# Patient Record
Sex: MALE | Race: WHITE | NOT HISPANIC OR LATINO | Employment: OTHER | URBAN - METROPOLITAN AREA
[De-identification: names, ages, dates, MRNs, and addresses within clinical notes are randomized per-mention and may not be internally consistent; named-entity substitution may affect disease eponyms.]

---

## 2020-01-31 ENCOUNTER — HISTORICAL (OUTPATIENT)
Dept: ADMINISTRATIVE | Facility: HOSPITAL | Age: 80
End: 2020-01-31

## 2022-04-10 ENCOUNTER — HISTORICAL (OUTPATIENT)
Dept: ADMINISTRATIVE | Facility: HOSPITAL | Age: 82
End: 2022-04-10

## 2022-04-26 VITALS
OXYGEN SATURATION: 98 % | WEIGHT: 184.94 LBS | DIASTOLIC BLOOD PRESSURE: 60 MMHG | SYSTOLIC BLOOD PRESSURE: 128 MMHG | HEIGHT: 67 IN | BODY MASS INDEX: 29.03 KG/M2

## 2023-06-14 ENCOUNTER — OFFICE VISIT (OUTPATIENT)
Dept: FAMILY MEDICINE | Facility: CLINIC | Age: 83
End: 2023-06-14
Payer: MEDICARE

## 2023-06-14 VITALS
HEART RATE: 92 BPM | TEMPERATURE: 98 F | SYSTOLIC BLOOD PRESSURE: 114 MMHG | OXYGEN SATURATION: 98 % | BODY MASS INDEX: 26.78 KG/M2 | DIASTOLIC BLOOD PRESSURE: 56 MMHG | WEIGHT: 170.63 LBS | HEIGHT: 67 IN

## 2023-06-14 DIAGNOSIS — I10 PRIMARY HYPERTENSION: ICD-10-CM

## 2023-06-14 DIAGNOSIS — N18.2 STAGE 2 CHRONIC KIDNEY DISEASE: ICD-10-CM

## 2023-06-14 DIAGNOSIS — E87.1 HYPONATREMIA: ICD-10-CM

## 2023-06-14 DIAGNOSIS — E11.22 TYPE 2 DIABETES MELLITUS WITH STAGE 2 CHRONIC KIDNEY DISEASE, WITHOUT LONG-TERM CURRENT USE OF INSULIN: ICD-10-CM

## 2023-06-14 DIAGNOSIS — Z01.818 PRE-OPERATIVE CLEARANCE: ICD-10-CM

## 2023-06-14 DIAGNOSIS — M19.90 OSTEOARTHRITIS, UNSPECIFIED OSTEOARTHRITIS TYPE, UNSPECIFIED SITE: ICD-10-CM

## 2023-06-14 DIAGNOSIS — R82.71 BACTERIURIA: ICD-10-CM

## 2023-06-14 DIAGNOSIS — J44.89 ASTHMA-CHRONIC OBSTRUCTIVE PULMONARY DISEASE OVERLAP SYNDROME: Primary | ICD-10-CM

## 2023-06-14 DIAGNOSIS — N18.2 TYPE 2 DIABETES MELLITUS WITH STAGE 2 CHRONIC KIDNEY DISEASE, WITHOUT LONG-TERM CURRENT USE OF INSULIN: ICD-10-CM

## 2023-06-14 PROBLEM — E11.9 DIABETES MELLITUS: Status: ACTIVE | Noted: 2023-06-14

## 2023-06-14 LAB
ANION GAP SERPL CALC-SCNC: 9 MEQ/L (ref 2–13)
BILIRUB SERPL-MCNC: NEGATIVE MG/DL
BLOOD URINE, POC: NORMAL
BUN SERPL-MCNC: 34 MG/DL (ref 7–20)
CALCIUM SERPL-MCNC: 9.5 MG/DL (ref 8.4–10.2)
CHLORIDE SERPL-SCNC: 99 MMOL/L (ref 98–110)
CO2 SERPL-SCNC: 27 MMOL/L (ref 21–32)
COLOR, POC UA: YELLOW
CREAT SERPL-MCNC: 1.16 MG/DL (ref 0.66–1.25)
CREAT/UREA NIT SERPL: 29 (ref 12–20)
GFR SERPLBLD CREATININE-BSD FMLA CKD-EPI: 62 MLS/MIN/1.73/M2
GLUCOSE SERPL-MCNC: 148 MG/DL (ref 70–115)
GLUCOSE UR QL STRIP: >=1000
KETONES UR QL STRIP: NEGATIVE
LEUKOCYTE ESTERASE URINE, POC: NORMAL
NITRITE, POC UA: NEGATIVE
PH, POC UA: 5
POTASSIUM SERPL-SCNC: 4.4 MMOL/L (ref 3.5–5.1)
PROTEIN, POC: NORMAL
SODIUM SERPL-SCNC: 135 MMOL/L (ref 135–145)
SPECIFIC GRAVITY, POC UA: 1.01
UROBILINOGEN, POC UA: 0.2

## 2023-06-14 PROCEDURE — 99214 OFFICE O/P EST MOD 30 MIN: CPT | Mod: ,,, | Performed by: FAMILY MEDICINE

## 2023-06-14 PROCEDURE — 81003 URINALYSIS AUTO W/O SCOPE: CPT | Mod: RHCUB | Performed by: FAMILY MEDICINE

## 2023-06-14 PROCEDURE — 99214 PR OFFICE/OUTPT VISIT, EST, LEVL IV, 30-39 MIN: ICD-10-PCS | Mod: ,,, | Performed by: FAMILY MEDICINE

## 2023-06-14 RX ORDER — CIPROFLOXACIN HYDROCHLORIDE 250 MG/1
250 TABLET, FILM COATED ORAL EVERY 12 HOURS
COMMUNITY
Start: 2023-06-05 | End: 2023-06-16

## 2023-06-14 RX ORDER — EMPAGLIFLOZIN 10 MG/1
10 TABLET, FILM COATED ORAL DAILY
COMMUNITY
Start: 2023-05-17 | End: 2023-09-13

## 2023-06-14 RX ORDER — GLIMEPIRIDE 4 MG/1
4 TABLET ORAL DAILY
COMMUNITY
Start: 2023-04-19 | End: 2023-11-20

## 2023-06-14 RX ORDER — CIPROFLOXACIN 500 MG/1
TABLET ORAL
COMMUNITY
Start: 2023-06-05 | End: 2023-06-16

## 2023-06-14 RX ORDER — ASPIRIN 81 MG/1
81 TABLET ORAL DAILY
COMMUNITY

## 2023-06-14 RX ORDER — SITAGLIPTIN AND METFORMIN HYDROCHLORIDE 1000; 50 MG/1; MG/1
1 TABLET, FILM COATED ORAL 2 TIMES DAILY
COMMUNITY
Start: 2023-06-07 | End: 2023-09-11

## 2023-06-14 RX ORDER — LOSARTAN POTASSIUM 100 MG/1
100 TABLET ORAL DAILY
COMMUNITY
Start: 2023-05-17 | End: 2023-09-05

## 2023-06-14 RX ORDER — HYDROCHLOROTHIAZIDE 25 MG/1
TABLET ORAL
COMMUNITY
Start: 2022-08-31

## 2023-06-14 RX ORDER — FLUTICASONE PROPIONATE 50 MCG
2 SPRAY, SUSPENSION (ML) NASAL DAILY PRN
COMMUNITY
End: 2023-06-16

## 2023-06-14 RX ORDER — DUTASTERIDE 0.5 MG/1
0.5 CAPSULE, LIQUID FILLED ORAL DAILY
COMMUNITY
Start: 2023-03-21 | End: 2023-09-18

## 2023-06-14 RX ORDER — TAMSULOSIN HYDROCHLORIDE 0.4 MG/1
1 CAPSULE ORAL 2 TIMES DAILY
COMMUNITY
Start: 2023-05-12 | End: 2023-11-20

## 2023-06-14 RX ORDER — PIOGLITAZONEHYDROCHLORIDE 15 MG/1
15 TABLET ORAL DAILY
COMMUNITY
Start: 2023-05-17 | End: 2023-09-05

## 2023-06-14 NOTE — PROGRESS NOTES
"SUBJECTIVE:  Jose Alfredo Shearer is a 83 y.o. male here for No chief complaint on file.      HPI  Patient is here for preop exam.  He is scheduled next week for a total knee arthroplasty.  Patient has multiple medical problems including some advanced age at 83.  He has a history of hypertension which has been stable on losartan and hydrochlorothiazide.  He has a history of diabetes which has been very well controlled on glimepiride, Jardiance and pioglitazone.  His preop labs included a urinalysis that had a culture that grew Enterococcus.  He was started on Cipro by the orthopedic surgeon.  He said prior to this he was getting up several times at night to urinate and this has improved since starting the antibiotics.  He is also on Flomax for BPH.  He has a history of chronic kidney disease stage 2 and he did have some mild hyponatremia with a sodium of 135 on his initial preop labs.  Jose Alfredo's allergies, medications, history, and problem list were updated as appropriate.    Review of Systems   No chest pain or shortness of breath.  He does get little tired with walking.  He also has a lot of pain in his knees when he walks and he has to use a cane    Recent Results (from the past 504 hour(s))   POCT urinalysis, dipstick or tablet reag    Collection Time: 06/14/23 11:16 AM   Result Value Ref Range    Color, UA Yellow     Spec Grav UA 1.015     pH, UA 5.0     WBC, UA small     Nitrite, UA negative     Protein, POC trace     Glucose, UA >=1,000     Ketones, UA negative     Urobilinogen, UA 0.2     Bilirubin, POC negative     Blood, UA moderate        OBJECTIVE:  Vital signs  Vitals:    06/14/23 1043   BP: (!) 114/56   BP Location: Right arm   Patient Position: Sitting   Pulse: 92   Temp: 97.7 °F (36.5 °C)   TempSrc: Oral   SpO2: 98%   Weight: 36926 g (170 lb 9.6 oz)   Height: 169.5 cm (66.73")        Physical Exam heart regular rate and rhythm, lungs are clear to auscultation bilateral    ASSESSMENT/PLAN:  1. " Asthma-chronic obstructive pulmonary disease overlap syndrome  This has been doing well lately and not having to use his inhaler in quite some time    2. Primary hypertension  Blood pressure is very well controlled.  I will talk to him in a few days about what medications to hold prior to his surgery    3. Stage 2 chronic kidney disease  Repeat his BMP to check his sodium today  -     Basic Metabolic Panel; Future; Expected date: 06/14/2023    4. Type 2 diabetes mellitus with stage 2 chronic kidney disease, without long-term current use of insulin  A1c has been very well controlled with his most recent A1c of 6.1.  We will need to discontinue his Jardiance 2 days before surgery.  Patient did not have his medications with him today so I asked him to come back in 2 days so we can go over all of the medications we will need to hold prior to surgery    5. Osteoarthritis, unspecified osteoarthritis type, unspecified site  Stable    6. Bacteriuria  Repeat his urinalysis and culture today  -     POCT urinalysis, dipstick or tablet reag  -     Urine culture    7. Hyponatremia  Repeat his BMP today    8. Pre-operative clearance  I think from a cardiovascular standpoint he can tolerate the surgery.  He did have an EKG that was normal and I do not think he needs to have an echocardiogram as he is never had any heart failure type symptoms.         Follow Up:  Follow up in about 2 days (around 6/16/2023).  I would like him to follow up in 2 days just to go over each of his medications individually prior to his surgery early next week .I would also like to talk to the orthopedic surgeon about whether he might need rehab after

## 2023-06-16 ENCOUNTER — OFFICE VISIT (OUTPATIENT)
Dept: FAMILY MEDICINE | Facility: CLINIC | Age: 83
End: 2023-06-16
Payer: MEDICARE

## 2023-06-16 VITALS
TEMPERATURE: 98 F | BODY MASS INDEX: 26.68 KG/M2 | HEART RATE: 105 BPM | HEIGHT: 67 IN | WEIGHT: 170 LBS | SYSTOLIC BLOOD PRESSURE: 120 MMHG | DIASTOLIC BLOOD PRESSURE: 64 MMHG | OXYGEN SATURATION: 97 %

## 2023-06-16 DIAGNOSIS — N18.2 TYPE 2 DIABETES MELLITUS WITH STAGE 2 CHRONIC KIDNEY DISEASE, WITHOUT LONG-TERM CURRENT USE OF INSULIN: Primary | ICD-10-CM

## 2023-06-16 DIAGNOSIS — N18.2 STAGE 2 CHRONIC KIDNEY DISEASE: ICD-10-CM

## 2023-06-16 DIAGNOSIS — N39.0 UTI (URINARY TRACT INFECTION) DUE TO ENTEROCOCCUS: ICD-10-CM

## 2023-06-16 DIAGNOSIS — B95.2 UTI (URINARY TRACT INFECTION) DUE TO ENTEROCOCCUS: ICD-10-CM

## 2023-06-16 DIAGNOSIS — I10 PRIMARY HYPERTENSION: ICD-10-CM

## 2023-06-16 DIAGNOSIS — E11.22 TYPE 2 DIABETES MELLITUS WITH STAGE 2 CHRONIC KIDNEY DISEASE, WITHOUT LONG-TERM CURRENT USE OF INSULIN: Primary | ICD-10-CM

## 2023-06-16 PROCEDURE — 99214 OFFICE O/P EST MOD 30 MIN: CPT | Mod: ,,, | Performed by: FAMILY MEDICINE

## 2023-06-16 PROCEDURE — 99214 PR OFFICE/OUTPT VISIT, EST, LEVL IV, 30-39 MIN: ICD-10-PCS | Mod: ,,, | Performed by: FAMILY MEDICINE

## 2023-06-16 RX ORDER — OMEPRAZOLE 20 MG/1
20 CAPSULE, DELAYED RELEASE ORAL DAILY
COMMUNITY

## 2023-06-16 RX ORDER — CEFDINIR 300 MG/1
300 CAPSULE ORAL EVERY 12 HOURS
Qty: 10 CAPSULE | Refills: 0 | Status: SHIPPED | OUTPATIENT
Start: 2023-06-16 | End: 2023-06-21

## 2023-06-16 NOTE — PROGRESS NOTES
SUBJECTIVE:  Jose Alfredo Shearer is a 83 y.o. male here for Follow-up      HPI  Patient is here for follow-up in preparation for his total knee arthroplasty.  And still not been able to get in touch with the orthopedic surgeon to discuss whether he may need to go to rehab following surgery.  I did want to come back in with all his medications to go over each individual medication about which 1 to take prior to surgery.  Also noted that his urinalysis we repeated 2 days ago is growing Gram-negative rods again.  He had a UA done preoperatively that grew Enterobacter.  He was treated with 3 days of antibiotics you did.  He was having frequency of urination at night which has improved since taking the 3 days of antibiotics.  See assessment plan for individual list of medications to be held  Jose Alfredo's allergies, medications, history, and problem list were updated as appropriate.    Review of Systems   A comprehensive review of symptoms was completed and negative except as noted above.    Recent Results (from the past 504 hour(s))   Urine culture    Collection Time: 06/14/23 11:08 AM    Specimen: Urine, Clean Catch   Result Value Ref Range    Urine Culture >/= 100,000 colonies/ml Gram-negative Rods (A)    Basic Metabolic Panel    Collection Time: 06/14/23 11:14 AM   Result Value Ref Range    Sodium Level 135 135 - 145 mmol/L    Potassium Level 4.4 3.5 - 5.1 mmol/L    Chloride 99 98 - 110 mmol/L    Carbon Dioxide 27 21 - 32 mmol/L    Glucose Level 148 (H) 70 - 115 mg/dL    Blood Urea Nitrogen 34.0 (H) 7.0 - 20.0 mg/dL    Creatinine 1.16 0.66 - 1.25 mg/dL    BUN/Creatinine Ratio 29 (H) 12 - 20    Calcium Level Total 9.5 8.4 - 10.2 mg/dL    Anion Gap 9.0 2.0 - 13.0 mEq/L    eGFR 62 mls/min/1.73/m2   POCT urinalysis, dipstick or tablet reag    Collection Time: 06/14/23 11:16 AM   Result Value Ref Range    Color, UA Yellow     Spec Grav UA 1.015     pH, UA 5.0     WBC, UA small     Nitrite, UA negative     Protein, POC trace      "Glucose, UA >=1,000     Ketones, UA negative     Urobilinogen, UA 0.2     Bilirubin, POC negative     Blood, UA moderate        OBJECTIVE:  Vital signs  Vitals:    06/16/23 1053   BP: 120/64   BP Location: Right arm   Patient Position: Sitting   BP Method: Medium (Manual)   Pulse: 105   Temp: 98.1 °F (36.7 °C)   TempSrc: Temporal   SpO2: 97%   Weight: 77.1 kg (170 lb)   Height: 5' 6.73" (1.695 m)        Physical Exam heart with regular rate rhythm, lungs are clear, he is in no distress  ASSESSMENT/PLAN:  1. UTI  Will treat with cefdinir twice a day for 5 days.  2. Diabetes  I would like him to hold his Jardiance starting today and can restart the Jardiance when he gets home from surgery.  Stop the glimepiride the day before surgery on the day of surgery. He can continue his pioglitazone and Janumet daily  3. Hypertension  Discontinue his hydrochlorothiazide the day before surgery and the morning of surgery id continue his losartan each day      Follow Up:  No follow-ups on file.            "

## 2023-06-17 LAB — BACTERIA UR CULT: ABNORMAL

## 2023-06-20 ENCOUNTER — TELEPHONE (OUTPATIENT)
Dept: FAMILY MEDICINE | Facility: CLINIC | Age: 83
End: 2023-06-20
Payer: MEDICARE

## 2023-06-20 DIAGNOSIS — N39.0 UTI (URINARY TRACT INFECTION) DUE TO ENTEROCOCCUS: Primary | ICD-10-CM

## 2023-06-20 DIAGNOSIS — B95.2 UTI (URINARY TRACT INFECTION) DUE TO ENTEROCOCCUS: Primary | ICD-10-CM

## 2023-06-20 RX ORDER — CIPROFLOXACIN 500 MG/1
500 TABLET ORAL 2 TIMES DAILY
Qty: 28 TABLET | Refills: 0 | Status: SHIPPED | OUTPATIENT
Start: 2023-06-20 | End: 2023-07-04

## 2023-07-03 ENCOUNTER — TELEPHONE (OUTPATIENT)
Dept: FAMILY MEDICINE | Facility: CLINIC | Age: 83
End: 2023-07-03
Payer: MEDICARE

## 2023-07-05 ENCOUNTER — OFFICE VISIT (OUTPATIENT)
Dept: FAMILY MEDICINE | Facility: CLINIC | Age: 83
End: 2023-07-05
Payer: MEDICARE

## 2023-07-05 VITALS
WEIGHT: 173.63 LBS | HEART RATE: 100 BPM | BODY MASS INDEX: 27.25 KG/M2 | SYSTOLIC BLOOD PRESSURE: 128 MMHG | HEIGHT: 67 IN | OXYGEN SATURATION: 99 % | TEMPERATURE: 97 F | DIASTOLIC BLOOD PRESSURE: 68 MMHG

## 2023-07-05 DIAGNOSIS — Z01.818 PRE-OPERATIVE CLEARANCE: Primary | ICD-10-CM

## 2023-07-05 DIAGNOSIS — N30.01 ACUTE CYSTITIS WITH HEMATURIA: ICD-10-CM

## 2023-07-05 DIAGNOSIS — N18.2 TYPE 2 DIABETES MELLITUS WITH STAGE 2 CHRONIC KIDNEY DISEASE, WITHOUT LONG-TERM CURRENT USE OF INSULIN: ICD-10-CM

## 2023-07-05 DIAGNOSIS — N39.0 UTI (URINARY TRACT INFECTION): ICD-10-CM

## 2023-07-05 DIAGNOSIS — N18.2 STAGE 2 CHRONIC KIDNEY DISEASE: ICD-10-CM

## 2023-07-05 DIAGNOSIS — E11.22 TYPE 2 DIABETES MELLITUS WITH STAGE 2 CHRONIC KIDNEY DISEASE, WITHOUT LONG-TERM CURRENT USE OF INSULIN: ICD-10-CM

## 2023-07-05 LAB
BILIRUB UR QL STRIP: NEGATIVE
GLUCOSE UR QL STRIP: POSITIVE
KETONES UR QL STRIP: NEGATIVE
LEUKOCYTE ESTERASE UR QL STRIP: NEGATIVE
PH, POC UA: 6
POC BLOOD, URINE: POSITIVE
POC NITRATES, URINE: NEGATIVE
PROT UR QL STRIP: NEGATIVE
SP GR UR STRIP: 1.01 (ref 1–1.03)
UROBILINOGEN UR STRIP-ACNC: 0.2 (ref 0.3–2.2)

## 2023-07-05 PROCEDURE — 81003 URINALYSIS AUTO W/O SCOPE: CPT | Mod: QW,RHCUB | Performed by: FAMILY MEDICINE

## 2023-07-05 PROCEDURE — 99213 PR OFFICE/OUTPT VISIT, EST, LEVL III, 20-29 MIN: ICD-10-PCS | Mod: ,,, | Performed by: FAMILY MEDICINE

## 2023-07-05 PROCEDURE — 99213 OFFICE O/P EST LOW 20 MIN: CPT | Mod: ,,, | Performed by: FAMILY MEDICINE

## 2023-07-05 NOTE — PROGRESS NOTES
"SUBJECTIVE:  Jose Alfredo Shearer is a 83 y.o. male here for Follow-up (Follow up UTI)      HPI  Patient is here for follow-up on his urinary tract infection.  We would to postpone his knee replacement because his urine culture a few weeks ago was growing E coli.  We went ahead and treated him with 2 weeks of antibiotics at this time.  He feels like the urine infection is gone and he has been urinating much better especially noticing he does not have to get up every hour at night.  Jose Alfredo's allergies, medications, history, and problem list were updated as appropriate.    Review of Systems   See [facility]    No results found for this or any previous visit (from the past 504 hour(s)).    OBJECTIVE:  Vital signs  Vitals:    07/05/23 1425   BP: 128/68   BP Location: Right arm   Pulse: 100   Temp: 97.2 °F (36.2 °C)   TempSrc: Temporal   SpO2: 99%   Weight: 78.7 kg (173 lb 9.6 oz)   Height: 5' 6.73" (1.695 m)        Physical Exam with a regular rate and rhythm    ASSESSMENT/PLAN:  1. Pre-operative clearance    -     POCT Urinalysis, Dipstick, Automated, W/O Scope    2. Acute cystitis with hematuria  Repeat urine culture today.  If clear after 48 hours we will go ahead and get him scheduled back for his knee replacement    3. Type 2 diabetes mellitus with stage 2 chronic kidney disease, without long-term current use of insulin  Stable.  He knows to hold his Jardiance the day before surgery    4. Stage 2 chronic kidney disease  Stable         Follow Up:  No follow-ups on file.            "

## 2023-07-08 LAB — BACTERIA UR CULT: NO GROWTH

## 2023-08-30 ENCOUNTER — TELEPHONE (OUTPATIENT)
Dept: FAMILY MEDICINE | Facility: CLINIC | Age: 83
End: 2023-08-30
Payer: MEDICARE

## 2023-09-05 DIAGNOSIS — E11.22 TYPE 2 DIABETES MELLITUS WITH STAGE 2 CHRONIC KIDNEY DISEASE, WITHOUT LONG-TERM CURRENT USE OF INSULIN: Primary | ICD-10-CM

## 2023-09-05 DIAGNOSIS — N18.2 TYPE 2 DIABETES MELLITUS WITH STAGE 2 CHRONIC KIDNEY DISEASE, WITHOUT LONG-TERM CURRENT USE OF INSULIN: Primary | ICD-10-CM

## 2023-09-05 DIAGNOSIS — I10 PRIMARY HYPERTENSION: ICD-10-CM

## 2023-09-05 RX ORDER — PIOGLITAZONEHYDROCHLORIDE 15 MG/1
15 TABLET ORAL
Qty: 90 TABLET | Refills: 3 | Status: SHIPPED | OUTPATIENT
Start: 2023-09-05

## 2023-09-05 RX ORDER — LOSARTAN POTASSIUM 100 MG/1
100 TABLET ORAL
Qty: 90 TABLET | Refills: 3 | Status: SHIPPED | OUTPATIENT
Start: 2023-09-05

## 2023-09-11 DIAGNOSIS — N18.2 TYPE 2 DIABETES MELLITUS WITH STAGE 2 CHRONIC KIDNEY DISEASE, WITHOUT LONG-TERM CURRENT USE OF INSULIN: ICD-10-CM

## 2023-09-11 DIAGNOSIS — E11.22 TYPE 2 DIABETES MELLITUS WITH STAGE 2 CHRONIC KIDNEY DISEASE, WITHOUT LONG-TERM CURRENT USE OF INSULIN: ICD-10-CM

## 2023-09-11 DIAGNOSIS — N18.2 STAGE 2 CHRONIC KIDNEY DISEASE: Primary | ICD-10-CM

## 2023-09-11 RX ORDER — SITAGLIPTIN AND METFORMIN HYDROCHLORIDE 1000; 50 MG/1; MG/1
1 TABLET, FILM COATED ORAL 2 TIMES DAILY
Qty: 180 TABLET | Refills: 3 | Status: SHIPPED | OUTPATIENT
Start: 2023-09-11

## 2023-09-13 DIAGNOSIS — N18.2 TYPE 2 DIABETES MELLITUS WITH STAGE 2 CHRONIC KIDNEY DISEASE, WITHOUT LONG-TERM CURRENT USE OF INSULIN: Primary | ICD-10-CM

## 2023-09-13 DIAGNOSIS — E11.22 TYPE 2 DIABETES MELLITUS WITH STAGE 2 CHRONIC KIDNEY DISEASE, WITHOUT LONG-TERM CURRENT USE OF INSULIN: Primary | ICD-10-CM

## 2023-09-13 RX ORDER — EMPAGLIFLOZIN 10 MG/1
10 TABLET, FILM COATED ORAL
Qty: 90 TABLET | Refills: 3 | Status: SHIPPED | OUTPATIENT
Start: 2023-09-13

## 2023-09-18 DIAGNOSIS — J44.89 ASTHMA-CHRONIC OBSTRUCTIVE PULMONARY DISEASE OVERLAP SYNDROME: Primary | ICD-10-CM

## 2023-09-18 RX ORDER — DUTASTERIDE 0.5 MG/1
0.5 CAPSULE, LIQUID FILLED ORAL
Qty: 90 CAPSULE | Refills: 3 | Status: SHIPPED | OUTPATIENT
Start: 2023-09-18

## 2023-10-16 ENCOUNTER — OFFICE VISIT (OUTPATIENT)
Dept: FAMILY MEDICINE | Facility: CLINIC | Age: 83
End: 2023-10-16
Payer: MEDICARE

## 2023-10-16 VITALS
HEIGHT: 67 IN | DIASTOLIC BLOOD PRESSURE: 72 MMHG | WEIGHT: 166.81 LBS | HEART RATE: 75 BPM | OXYGEN SATURATION: 98 % | BODY MASS INDEX: 26.18 KG/M2 | TEMPERATURE: 97 F | SYSTOLIC BLOOD PRESSURE: 122 MMHG

## 2023-10-16 DIAGNOSIS — R35.0 FREQUENCY OF URINATION: Primary | ICD-10-CM

## 2023-10-16 LAB
BILIRUB UR QL STRIP: NEGATIVE
GLUCOSE UR QL STRIP: POSITIVE
KETONES UR QL STRIP: NEGATIVE
LEUKOCYTE ESTERASE UR QL STRIP: NEGATIVE
PH, POC UA: 5.5
POC BLOOD, URINE: POSITIVE
POC NITRATES, URINE: NEGATIVE
PROT UR QL STRIP: NEGATIVE
SP GR UR STRIP: 1.01 (ref 1–1.03)
UROBILINOGEN UR STRIP-ACNC: 0.2 (ref 0.3–2.2)

## 2023-10-16 PROCEDURE — 99213 OFFICE O/P EST LOW 20 MIN: CPT | Mod: ,,, | Performed by: FAMILY MEDICINE

## 2023-10-16 PROCEDURE — 81003 URINALYSIS AUTO W/O SCOPE: CPT | Mod: QW,RHCUB | Performed by: FAMILY MEDICINE

## 2023-10-16 PROCEDURE — 87088 URINE BACTERIA CULTURE: CPT | Performed by: FAMILY MEDICINE

## 2023-10-16 PROCEDURE — 99213 PR OFFICE/OUTPT VISIT, EST, LEVL III, 20-29 MIN: ICD-10-PCS | Mod: ,,, | Performed by: FAMILY MEDICINE

## 2023-10-16 NOTE — PROGRESS NOTES
"SUBJECTIVE:  Jose Alfredo Shearer is a 83 y.o. male here for Pre-op Exam and Urinary Frequency      HPI  Patient is here for preop clearance for knee arthroplasty.  He did his other knee a little over a month ago in his done very well.  Prior to that operation he did have a urinary tract infection that we had to treat.  He has been having some urinary frequency overnight for the last 2 nights.    Jose Alfredo's allergies, medications, history, and problem list were updated as appropriate.    Review of Systems   See HPI.    Recent Results (from the past 504 hour(s))   POCT Urinalysis, Dipstick, Automated, W/O Scope    Collection Time: 10/16/23  3:21 PM   Result Value Ref Range    POC Blood, Urine Positive (A) Negative    POC Bilirubin, Urine Negative Negative    POC Urobilinogen, Urine 0.2 (A) 0.3 - 2.2    POC Ketones, Urine Negative Negative    POC Protein, Urine Negative Negative    POC Nitrates, Urine Negative Negative    POC Glucose, Urine Positive (A) Negative    pH, UA 5.5     POC Specific Gravity, Urine 1.015 1.003 - 1.029    POC Leukocytes, Urine Negative Negative       OBJECTIVE:  Vital signs  Vitals:    10/16/23 1504   BP: 122/72   BP Location: Right arm   Patient Position: Sitting   BP Method: Medium (Manual)   Pulse: 75   Temp: 96.8 °F (36 °C)   TempSrc: Temporal   SpO2: 98%   Weight: 75.7 kg (166 lb 12.8 oz)   Height: 5' 6.73" (1.695 m)        Physical Exam heart with a regular rate and rhythm, lungs are clear, non ill-appearing    ASSESSMENT/PLAN:  1. Frequency of urination  Urinalysis shows glucose but he is on Farxiga.  There are no leukocytes or nitrites.  We will go ahead and send for culture though just to make sure before surgery  2. Preop clearance - patient is cleared medically for knee arthroplasty  -     POCT Urinalysis, Dipstick, Automated, W/O Scope  -     Urine Culture High Risk         Follow Up:  No follow-ups on file.            "

## 2023-10-19 LAB — BACTERIA UR CULT: NO GROWTH

## 2024-09-17 ENCOUNTER — TELEPHONE (OUTPATIENT)
Dept: FAMILY MEDICINE | Facility: CLINIC | Age: 84
End: 2024-09-17
Payer: MEDICARE

## 2024-09-17 DIAGNOSIS — E11.22 TYPE 2 DIABETES MELLITUS WITH STAGE 2 CHRONIC KIDNEY DISEASE, WITHOUT LONG-TERM CURRENT USE OF INSULIN: ICD-10-CM

## 2024-09-17 DIAGNOSIS — N18.2 TYPE 2 DIABETES MELLITUS WITH STAGE 2 CHRONIC KIDNEY DISEASE, WITHOUT LONG-TERM CURRENT USE OF INSULIN: ICD-10-CM

## 2024-09-17 RX ORDER — EMPAGLIFLOZIN 10 MG/1
10 TABLET, FILM COATED ORAL
Qty: 90 TABLET | Refills: 3 | OUTPATIENT
Start: 2024-09-17

## 2024-09-26 PROCEDURE — 85025 COMPLETE CBC W/AUTO DIFF WBC: CPT | Performed by: FAMILY MEDICINE

## 2024-09-26 PROCEDURE — 80061 LIPID PANEL: CPT | Performed by: FAMILY MEDICINE

## 2024-09-26 PROCEDURE — 84153 ASSAY OF PSA TOTAL: CPT | Performed by: FAMILY MEDICINE

## 2024-09-26 PROCEDURE — 83036 HEMOGLOBIN GLYCOSYLATED A1C: CPT | Performed by: FAMILY MEDICINE

## 2024-09-26 PROCEDURE — 80053 COMPREHEN METABOLIC PANEL: CPT | Performed by: FAMILY MEDICINE

## 2024-09-26 PROCEDURE — 84443 ASSAY THYROID STIM HORMONE: CPT | Performed by: FAMILY MEDICINE

## 2024-10-03 ENCOUNTER — OFFICE VISIT (OUTPATIENT)
Dept: FAMILY MEDICINE | Facility: CLINIC | Age: 84
End: 2024-10-03
Payer: MEDICARE

## 2024-10-03 VITALS
OXYGEN SATURATION: 96 % | HEART RATE: 69 BPM | SYSTOLIC BLOOD PRESSURE: 126 MMHG | DIASTOLIC BLOOD PRESSURE: 80 MMHG | WEIGHT: 174.38 LBS | TEMPERATURE: 97 F | BODY MASS INDEX: 27.37 KG/M2 | HEIGHT: 67 IN

## 2024-10-03 DIAGNOSIS — Z23 ENCOUNTER FOR IMMUNIZATION: Primary | ICD-10-CM

## 2024-10-03 DIAGNOSIS — N18.2 TYPE 2 DIABETES MELLITUS WITH STAGE 2 CHRONIC KIDNEY DISEASE, WITHOUT LONG-TERM CURRENT USE OF INSULIN: ICD-10-CM

## 2024-10-03 DIAGNOSIS — E11.22 TYPE 2 DIABETES MELLITUS WITH STAGE 2 CHRONIC KIDNEY DISEASE, WITHOUT LONG-TERM CURRENT USE OF INSULIN: ICD-10-CM

## 2024-10-03 DIAGNOSIS — I10 PRIMARY HYPERTENSION: ICD-10-CM

## 2024-10-03 DIAGNOSIS — N40.0 BENIGN PROSTATIC HYPERPLASIA, UNSPECIFIED WHETHER LOWER URINARY TRACT SYMPTOMS PRESENT: ICD-10-CM

## 2024-10-03 DIAGNOSIS — Z00.00 MEDICARE ANNUAL WELLNESS VISIT, SUBSEQUENT: ICD-10-CM

## 2024-10-03 DIAGNOSIS — J44.89 ASTHMA-CHRONIC OBSTRUCTIVE PULMONARY DISEASE OVERLAP SYNDROME: ICD-10-CM

## 2024-10-03 RX ORDER — SITAGLIPTIN AND METFORMIN HYDROCHLORIDE 1000; 50 MG/1; MG/1
1 TABLET, FILM COATED ORAL 2 TIMES DAILY
Qty: 180 TABLET | Refills: 3 | Status: SHIPPED | OUTPATIENT
Start: 2024-10-03

## 2024-10-03 RX ORDER — HYDROCHLOROTHIAZIDE 25 MG/1
25 TABLET ORAL DAILY
Qty: 90 TABLET | Refills: 3 | Status: SHIPPED | OUTPATIENT
Start: 2024-10-03

## 2024-10-03 RX ORDER — DUTASTERIDE 0.5 MG/1
0.5 CAPSULE, LIQUID FILLED ORAL DAILY
Qty: 90 CAPSULE | Refills: 3 | Status: SHIPPED | OUTPATIENT
Start: 2024-10-03

## 2024-10-03 RX ORDER — GLIMEPIRIDE 4 MG/1
4 TABLET ORAL
Qty: 90 TABLET | Refills: 3 | Status: SHIPPED | OUTPATIENT
Start: 2024-10-03

## 2024-10-03 RX ORDER — TAMSULOSIN HYDROCHLORIDE 0.4 MG/1
1 CAPSULE ORAL 2 TIMES DAILY
Qty: 180 CAPSULE | Refills: 3 | Status: SHIPPED | OUTPATIENT
Start: 2024-10-03

## 2024-10-03 RX ORDER — PIOGLITAZONEHYDROCHLORIDE 15 MG/1
15 TABLET ORAL DAILY
Qty: 90 TABLET | Refills: 3 | Status: SHIPPED | OUTPATIENT
Start: 2024-10-03

## 2024-10-03 RX ORDER — FLUTICASONE PROPIONATE 50 MCG
2 SPRAY, SUSPENSION (ML) NASAL DAILY
COMMUNITY
Start: 2024-09-12

## 2024-10-03 RX ORDER — LOSARTAN POTASSIUM 100 MG/1
100 TABLET ORAL DAILY
Qty: 90 TABLET | Refills: 3 | Status: SHIPPED | OUTPATIENT
Start: 2024-10-03

## 2024-10-03 NOTE — PROGRESS NOTES
SUBJECTIVE:  Jose Alfredo Shearer is a 84 y.o. male here for Medicare AWV Follow Up      HPI  Patient is here for annual Medicare wellness and follow-up on chronic conditions.  He is doing pretty well at this time with no real new problems to report  Jose Alfredo's allergies, medications, history, and problem list were updated as appropriate.    Review of Systems   Constitutional:  Negative for activity change, appetite change, fatigue and fever.   HENT:  Positive for hearing loss. Negative for congestion, ear pain, sore throat and trouble swallowing.    Eyes:  Negative for photophobia, pain, redness and visual disturbance.   Respiratory:  Negative for cough, chest tightness, shortness of breath and wheezing.    Cardiovascular:  Negative for chest pain, palpitations and leg swelling.   Gastrointestinal:  Negative for abdominal distention, abdominal pain and blood in stool.   Endocrine: Negative for cold intolerance, heat intolerance, polydipsia and polyuria.   Genitourinary:  Negative for difficulty urinating, dysuria and frequency.   Musculoskeletal:  Positive for arthralgias. Negative for gait problem, joint swelling and myalgias.   Skin:  Negative for color change, pallor and rash.   Allergic/Immunologic: Negative.    Neurological:  Negative for dizziness, seizures, speech difficulty, weakness and headaches.   Hematological:  Negative for adenopathy. Does not bruise/bleed easily.   Psychiatric/Behavioral:  Negative for agitation and confusion.       A comprehensive review of symptoms was completed and negative except as noted above.    Recent Results (from the past 3 weeks)   Comprehensive Metabolic Panel    Collection Time: 09/26/24  9:40 AM   Result Value Ref Range    Sodium 138 136 - 145 mmol/L    Potassium 4.6 3.5 - 5.1 mmol/L    Chloride 104 98 - 110 mmol/L    CO2 28 21 - 32 mmol/L    Glucose 109 70 - 115 mg/dL    Blood Urea Nitrogen 28 (H) 7.0 - 20.0 mg/dL    Creatinine 1.00 0.66 - 1.25 mg/dL    Calcium 9.5 8.4 -  10.2 mg/dL    Protein Total 7.3 6.3 - 8.2 gm/dL    Albumin 4.6 3.4 - 5.0 g/dL    Globulin 2.7 2.0 - 3.9 gm/dL    Albumin/Globulin Ratio 1.7 ratio    Bilirubin Total 1.0 0.0 - 1.0 mg/dL    ALP 68 50 - 144 unit/L    ALT 14 1 - 45 unit/L    AST 22 17 - 59 unit/L    eGFR 74 mL/min/1.73/m2    Anion Gap 6.0 2.0 - 13.0 mEq/L    BUN/Creatinine Ratio 28 (H) 12 - 20   Hemoglobin A1C    Collection Time: 09/26/24  9:40 AM   Result Value Ref Range    Hemoglobin A1c 6.4 (H) 4.0 - 6.0 %    Estimated Average Glucose 137.0 (H) 70.0 - 115.0 mg/dL   Lipid Panel    Collection Time: 09/26/24  9:40 AM   Result Value Ref Range    Cholesterol Total 154 0 - 200 mg/dL    HDL Cholesterol 79 (H) 40 - 60 mg/dL    Triglyceride 53 30 - 200 mg/dL    LDL Cholesterol Direct 69.2 30.0 - 100.0 mg/dL   TSH    Collection Time: 09/26/24  9:40 AM   Result Value Ref Range    TSH 2.340 0.360 - 3.740 uIU/mL   PSA, Screening    Collection Time: 09/26/24  9:40 AM   Result Value Ref Range    Prostate Specific Antigen 0.68 <=4.00 ng/mL   CBC with Differential    Collection Time: 09/26/24  9:40 AM   Result Value Ref Range    WBC 6.34 4.00 - 11.50 x10(3)/mcL    RBC 4.84 4.00 - 6.00 x10(6)/mcL    Hgb 14.6 13.0 - 18.0 g/dL    Hct 42.5 36.0 - 52.0 %    MCV 87.8 79.0 - 99.0 fL    MCH 30.2 27.0 - 34.0 pg    MCHC 34.4 31.0 - 37.0 g/dL    RDW 14.2 %    Platelet 201 140 - 371 x10(3)/mcL    MPV 9.0 (L) 9.4 - 12.4 fL    Neut % 59.0 37 - 73 %    Lymph % 23.7 20 - 55 %    Mono % 8.2 4.7 - 12.5 %    Eos % 7.6 (H) 0.7 - 7 %    Basophil % 0.2 0.1 - 1.2 %    Lymph # 1.50 1.32 - 3.57 x10(3)/mcL    Neut # 3.75 1.78 - 5.38 x10(3)/mcL    Mono # 0.52 0.3 - 0.82 x10(3)/mcL    Eos # 0.48 0.04 - 0.54 x10(3)/mcL    Baso # 0.01 0.01 - 0.08 x10(3)/mcL    IG# 0.08 (H) 0.0001 - 0.031 x10(3)/mcL    IG% 1.3 (H) 0 - 0.5 %    NRBC% 0.0 <=1 %       OBJECTIVE:  Vital signs  Vitals:    10/03/24 1108   BP: 126/80   BP Location: Right arm   Patient Position: Sitting   Pulse: 69   Temp: 96.5 °F (35.8  "°C)   TempSrc: Oral   SpO2: 96%   Weight: 79.1 kg (174 lb 6.4 oz)   Height: 5' 6.73" (1.695 m)        Physical Exam  Vitals and nursing note reviewed.   Constitutional:       General: He is not in acute distress.     Appearance: Normal appearance. He is not ill-appearing.   HENT:      Head: Normocephalic and atraumatic.      Right Ear: External ear normal.      Left Ear: External ear normal.      Nose: Nose normal.      Mouth/Throat:      Mouth: Mucous membranes are moist.      Pharynx: Oropharynx is clear.   Eyes:      Extraocular Movements: Extraocular movements intact.      Conjunctiva/sclera: Conjunctivae normal.   Cardiovascular:      Rate and Rhythm: Normal rate and regular rhythm.      Heart sounds: Normal heart sounds. No murmur heard.  Pulmonary:      Effort: Pulmonary effort is normal.      Breath sounds: Normal breath sounds. No wheezing or rhonchi.   Abdominal:      General: Abdomen is flat. There is no distension.      Palpations: Abdomen is soft.      Tenderness: There is no abdominal tenderness.   Musculoskeletal:         General: No swelling or deformity. Normal range of motion.      Cervical back: Normal range of motion and neck supple.   Skin:     General: Skin is warm and dry.      Findings: No bruising or rash.   Neurological:      General: No focal deficit present.      Mental Status: He is alert and oriented to person, place, and time.      Cranial Nerves: No cranial nerve deficit.      Motor: No weakness.      Gait: Gait abnormal.      Comments: Has a bit of an antalgic gait   Psychiatric:         Mood and Affect: Mood normal.         Behavior: Behavior normal.         Thought Content: Thought content normal.          ASSESSMENT/PLAN:  1. Encounter for immunization    -     influenza (adjuvanted) (Fluad) 45 mcg/0.5 mL IM vaccine (> or = 64 yo) 0.5 mL    2. Asthma-chronic obstructive pulmonary disease overlap syndrome  Has been doing well and having to use any inhalers recently      3. Type 2 " diabetes mellitus with stage 2 chronic kidney disease, without long-term current use of insulin  A1c is 6.5  -     empagliflozin (JARDIANCE) 10 mg tablet; Take 1 tablet (10 mg total) by mouth once daily.  Dispense: 90 tablet; Refill: 3  -     glimepiride (AMARYL) 4 MG tablet; Take 1 tablet (4 mg total) by mouth daily with breakfast.  Dispense: 90 tablet; Refill: 3  -     SITagliptan-metformin (JANUMET) 50-1,000 mg per tablet; Take 1 tablet by mouth 2 (two) times daily.  Dispense: 180 tablet; Refill: 3  -     pioglitazone (ACTOS) 15 MG tablet; Take 1 tablet (15 mg total) by mouth once daily.  Dispense: 90 tablet; Refill: 3    4. Primary hypertension  Blood pressure well controlled  -     losartan (COZAAR) 100 MG tablet; Take 1 tablet (100 mg total) by mouth once daily.  Dispense: 90 tablet; Refill: 3    5. Benign prostatic hyperplasia, unspecified whether lower urinary tract symptoms present  On Flomax and Avodart.  He does have to get up a few times a night.  We talked about maybe going to Urology to consider procedures for BPH  -     tamsulosin (FLOMAX) 0.4 mg Cap; Take 1 capsule (0.4 mg total) by mouth 2 (two) times daily.  Dispense: 180 capsule; Refill: 3    6. Medicare annual wellness visit, subsequent [Z00.00]  Cancer screening no longer needed    I offered to discuss advanced care planning,  and how to pick a person who would make decisions for you if you were unable to make them for herself, called a health care power of , and what kind of decisions you might make such as use of life-sustaining treatments such as ventilators and tube feeding when faced with a life-limiting illness recorded on a living will that they will need to know.( How to be cared for as you near the end of your natural life)    Patient is interested in learning more about how to make advance directives.  I provided them paperwork and offered to discuss this with them.     Other orders  -     hydroCHLOROthiazide (HYDRODIURIL) 25  MG tablet; Take 1 tablet (25 mg total) by mouth once daily.  Dispense: 90 tablet; Refill: 3         Follow Up:  Follow up in about 6 months (around 4/3/2025) for recheck, fasting labs.

## 2025-02-21 ENCOUNTER — TELEPHONE (OUTPATIENT)
Dept: FAMILY MEDICINE | Facility: CLINIC | Age: 85
End: 2025-02-21
Payer: MEDICARE

## 2025-04-08 ENCOUNTER — OFFICE VISIT (OUTPATIENT)
Dept: FAMILY MEDICINE | Facility: CLINIC | Age: 85
End: 2025-04-08
Payer: MEDICARE

## 2025-04-08 VITALS
DIASTOLIC BLOOD PRESSURE: 80 MMHG | OXYGEN SATURATION: 96 % | TEMPERATURE: 97 F | HEIGHT: 67 IN | WEIGHT: 178 LBS | BODY MASS INDEX: 27.94 KG/M2 | SYSTOLIC BLOOD PRESSURE: 136 MMHG | HEART RATE: 77 BPM

## 2025-04-08 DIAGNOSIS — I10 PRIMARY HYPERTENSION: ICD-10-CM

## 2025-04-08 DIAGNOSIS — J44.89 ASTHMA-CHRONIC OBSTRUCTIVE PULMONARY DISEASE OVERLAP SYNDROME: Primary | ICD-10-CM

## 2025-04-08 DIAGNOSIS — N18.2 STAGE 2 CHRONIC KIDNEY DISEASE: ICD-10-CM

## 2025-04-08 DIAGNOSIS — N18.2 TYPE 2 DIABETES MELLITUS WITH STAGE 2 CHRONIC KIDNEY DISEASE, WITHOUT LONG-TERM CURRENT USE OF INSULIN: ICD-10-CM

## 2025-04-08 DIAGNOSIS — E11.22 TYPE 2 DIABETES MELLITUS WITH STAGE 2 CHRONIC KIDNEY DISEASE, WITHOUT LONG-TERM CURRENT USE OF INSULIN: ICD-10-CM

## 2025-04-08 LAB
ALBUMIN SERPL-MCNC: 4.7 G/DL (ref 3.4–5)
ALBUMIN/GLOB SERPL: 1.6 RATIO
ALP SERPL-CCNC: 84 UNIT/L (ref 50–144)
ALT SERPL-CCNC: 16 UNIT/L (ref 1–45)
ANION GAP SERPL CALC-SCNC: 5 MEQ/L (ref 2–13)
AST SERPL-CCNC: 24 UNIT/L (ref 17–59)
BASOPHILS # BLD AUTO: 0.02 X10(3)/MCL (ref 0.01–0.08)
BASOPHILS NFR BLD AUTO: 0.3 % (ref 0.1–1.2)
BILIRUB SERPL-MCNC: 0.7 MG/DL (ref 0–1)
BUN SERPL-MCNC: 28 MG/DL (ref 7–20)
CALCIUM SERPL-MCNC: 9 MG/DL (ref 8.4–10.2)
CHLORIDE SERPL-SCNC: 101 MMOL/L (ref 98–110)
CHOLEST SERPL-MCNC: 154 MG/DL (ref 0–200)
CO2 SERPL-SCNC: 30 MMOL/L (ref 21–32)
CREAT SERPL-MCNC: 0.95 MG/DL (ref 0.66–1.25)
CREAT/UREA NIT SERPL: 29 (ref 12–20)
EOSINOPHIL # BLD AUTO: 0.59 X10(3)/MCL (ref 0.04–0.54)
EOSINOPHIL NFR BLD AUTO: 9.4 % (ref 0.7–7)
ERYTHROCYTE [DISTWIDTH] IN BLOOD BY AUTOMATED COUNT: 13.1 %
EST. AVERAGE GLUCOSE BLD GHB EST-MCNC: 165.7 MG/DL (ref 70–115)
GFR SERPLBLD CREATININE-BSD FMLA CKD-EPI: 78 ML/MIN/1.73/M2
GLOBULIN SER-MCNC: 2.9 GM/DL (ref 2–3.9)
GLUCOSE SERPL-MCNC: 135 MG/DL (ref 70–115)
HBA1C MFR BLD: 7.4 % (ref 4–6)
HCT VFR BLD AUTO: 45 % (ref 36–52)
HDLC SERPL-MCNC: 64 MG/DL (ref 40–60)
HGB BLD-MCNC: 15.3 G/DL (ref 13–18)
IMM GRANULOCYTES # BLD AUTO: 0.02 X10(3)/MCL (ref 0–0.03)
IMM GRANULOCYTES NFR BLD AUTO: 0.3 % (ref 0–0.5)
LDLC SERPL DIRECT ASSAY-SCNC: 71.2 MG/DL (ref 30–100)
LYMPHOCYTES # BLD AUTO: 1.36 X10(3)/MCL (ref 1.32–3.57)
LYMPHOCYTES NFR BLD AUTO: 21.7 % (ref 20–55)
MCH RBC QN AUTO: 30.2 PG (ref 27–34)
MCHC RBC AUTO-ENTMCNC: 34 G/DL (ref 31–37)
MCV RBC AUTO: 88.8 FL (ref 79–99)
MONOCYTES # BLD AUTO: 0.43 X10(3)/MCL (ref 0.3–0.82)
MONOCYTES NFR BLD AUTO: 6.9 % (ref 4.7–12.5)
NEUTROPHILS # BLD AUTO: 3.85 X10(3)/MCL (ref 1.78–5.38)
NEUTROPHILS NFR BLD AUTO: 61.4 % (ref 37–73)
NRBC BLD AUTO-RTO: 0 %
PLATELET # BLD AUTO: 201 X10(3)/MCL (ref 140–371)
PMV BLD AUTO: 9.3 FL (ref 9.4–12.4)
POTASSIUM SERPL-SCNC: 5.4 MMOL/L (ref 3.5–5.1)
PROT SERPL-MCNC: 7.6 GM/DL (ref 6.3–8.2)
RBC # BLD AUTO: 5.07 X10(6)/MCL (ref 4–6)
SODIUM SERPL-SCNC: 136 MMOL/L (ref 136–145)
TRIGL SERPL-MCNC: 65 MG/DL (ref 30–200)
WBC # BLD AUTO: 6.27 X10(3)/MCL (ref 4–11.5)

## 2025-04-08 PROCEDURE — 85025 COMPLETE CBC W/AUTO DIFF WBC: CPT | Performed by: FAMILY MEDICINE

## 2025-04-08 PROCEDURE — 80061 LIPID PANEL: CPT | Performed by: FAMILY MEDICINE

## 2025-04-08 PROCEDURE — 83036 HEMOGLOBIN GLYCOSYLATED A1C: CPT | Performed by: FAMILY MEDICINE

## 2025-04-08 PROCEDURE — 80053 COMPREHEN METABOLIC PANEL: CPT | Performed by: FAMILY MEDICINE

## 2025-04-08 PROCEDURE — 99214 OFFICE O/P EST MOD 30 MIN: CPT | Mod: ,,, | Performed by: FAMILY MEDICINE

## 2025-04-08 NOTE — PROGRESS NOTES
"SUBJECTIVE:  Jose Alfredo Shearer is a 85 y.o. male here for 6 mo follow up-labs      HPI  Patient here for follow-up on chronic conditions.  He has been doing pretty well lately.  He did not get his labs before this visit.  See assessment and plan for individual list of issues  Jose Alfredo's allergies, medications, history, and problem list were updated as appropriate.    Review of Systems   No new problems to report.  He has not been having any memory issues    No results found for this or any previous visit (from the past 3 weeks).    OBJECTIVE:  Vital signs  Vitals:    04/08/25 1029   BP: 136/80   Patient Position: Sitting   Pulse: 77   Temp: 96.8 °F (36 °C)   TempSrc: Oral   SpO2: 96%   Weight: 80.7 kg (178 lb)   Height: 5' 6.54" (1.69 m)        Physical Exam heart with a regular rate and rhythm, lungs are clear    ASSESSMENT/PLAN:  1. Asthma-chronic obstructive pulmonary disease overlap syndrome  Has been doing well not requiring any inhalers  -     CBC Auto Differential; Future; Expected date: 04/08/2025  -     Comprehensive Metabolic Panel; Future; Expected date: 04/08/2025  -     Hemoglobin A1C; Future; Expected date: 04/08/2025  -     Lipid Panel; Future; Expected date: 04/08/2025    2. Primary hypertension  Blood pressure well controlled on hydrochlorothiazide and losartan  -     CBC Auto Differential; Future; Expected date: 04/08/2025  -     Comprehensive Metabolic Panel; Future; Expected date: 04/08/2025  -     Hemoglobin A1C; Future; Expected date: 04/08/2025  -     Lipid Panel; Future; Expected date: 04/08/2025    3. Stage 2 chronic kidney disease  Stable.  Check renal function today  -     CBC Auto Differential; Future; Expected date: 04/08/2025  -     Comprehensive Metabolic Panel; Future; Expected date: 04/08/2025  -     Hemoglobin A1C; Future; Expected date: 04/08/2025  -     Lipid Panel; Future; Expected date: 04/08/2025    4. Type 2 diabetes mellitus with stage 2 chronic kidney disease, without " long-term current use of insulin  Last A1c was 6.4.  Repeat A1c today.  Continue his SGLT2 inhibitor along with glimepiride and Janumet  Overview:  Lab Results   Component Value Date    HGBA1C 6.4 (H) 09/26/2024         Orders:  -     CBC Auto Differential; Future; Expected date: 04/08/2025  -     Comprehensive Metabolic Panel; Future; Expected date: 04/08/2025  -     Hemoglobin A1C; Future; Expected date: 04/08/2025  -     Lipid Panel; Future; Expected date: 04/08/2025         Follow Up:  Follow up in about 6 months (around 10/8/2025) for recheck, fasting labs, wellness.

## 2025-04-09 ENCOUNTER — RESULTS FOLLOW-UP (OUTPATIENT)
Dept: FAMILY MEDICINE | Facility: CLINIC | Age: 85
End: 2025-04-09